# Patient Record
Sex: MALE
[De-identification: names, ages, dates, MRNs, and addresses within clinical notes are randomized per-mention and may not be internally consistent; named-entity substitution may affect disease eponyms.]

---

## 2024-07-10 ENCOUNTER — DASHBOARD ENCOUNTERS (OUTPATIENT)
Age: 24
End: 2024-07-10

## 2024-07-10 ENCOUNTER — OFFICE VISIT (OUTPATIENT)
Dept: URBAN - METROPOLITAN AREA CLINIC 80 | Facility: CLINIC | Age: 24
End: 2024-07-10
Payer: COMMERCIAL

## 2024-07-10 VITALS
BODY MASS INDEX: 23.34 KG/M2 | SYSTOLIC BLOOD PRESSURE: 110 MMHG | TEMPERATURE: 97.7 F | HEART RATE: 109 BPM | WEIGHT: 163 LBS | HEIGHT: 70 IN | DIASTOLIC BLOOD PRESSURE: 70 MMHG

## 2024-07-10 DIAGNOSIS — K92.1 BLOOD IN STOOL: ICD-10-CM

## 2024-07-10 DIAGNOSIS — R11.10 VOMITING, UNSPECIFIED VOMITING TYPE, UNSPECIFIED WHETHER NAUSEA PRESENT: ICD-10-CM

## 2024-07-10 DIAGNOSIS — R10.13 EPIGASTRIC PAIN: ICD-10-CM

## 2024-07-10 PROCEDURE — 99203 OFFICE O/P NEW LOW 30 MIN: CPT

## 2024-07-10 RX ORDER — PANTOPRAZOLE SODIUM 40 MG/1
1 TABLET TABLET, DELAYED RELEASE ORAL ONCE A DAY
Qty: 90 TABLET | Refills: 3 | OUTPATIENT
Start: 2024-07-10

## 2024-07-10 NOTE — HPI-TODAY'S VISIT:
Pt was seen at urgent care on 7/1 for abdominal pain and vomiting blood x one day. no fever or chills.  Pt was prescribed a PPI and given a GI cocktail and was discharged with ER precautions. Patient then went to the emergency department with the same complaints. He noted that the small amount of blood in his vomit was only that morning. After the UC and GI cocktail that epigastric pain went away but then shortly after the pain returnedand he went to the ED. Labs on 7/1 notable for WBC count of 7.6, hgb 15.2, and HCT 45.3. AST 27, ALT 15, alk phos 86, and total bili 0.6. A CT was completed showing No acute abnormality identified. Patient was then discharged with Protonix, carafate, and Zofran Today: pt reports that the weekend prior to 7/1 he drank "some" but not much etoh. He does note eating some salmon that may have been bad. After which, pt woke up with epigastric abdominal pain and then threw up one time on 7/1. After he threw up, he spit and noticed a small amount of bright red blood in his spit. pt notes having a normal BM that day and no change in bowel habits, except for a small amount of constipation after the ER visit. All symptoms of N/V and abdominal pain had resolved after ER visit.  Denies heartburn, melena.  Pt does note a small amount of bright red blood on the tissue that he has noticed intermittently x 1 year. Denies melena   Severity/ Pain scale: 8/10 when he was having the pain, denies current pain  Whatalleviates the symptoms: GI cocktail made it better, sitting up straight  What aggravates the symptoms: movement  Any recent weight changes: denies unintentional weight loss Any recent dietary changes: pt notes eating more fatty food and pork since he has come back home from [place] 3 weeks ago  ETOH use? Denies excessive etoh use  Change in bowel habits? normal bowel habit is 2 BM per day bss 4  Last colonoscopy and egd: never  No family history of colon polyps or colon cancer. Patient denies current nausea, heartburn, dysphagia, current abdominal pain, melena, unintentional weight loss, changes in bowel habits, except a couple of days of constipation after ER visit, and  denies loss of appetite. Patient denies blood thinner use, pacemaker/defibrillator, diabetes, kidney disease, and home O2. No fever/chills

## 2024-08-21 ENCOUNTER — CLAIMS CREATED FROM THE CLAIM WINDOW (OUTPATIENT)
Dept: URBAN - METROPOLITAN AREA CLINIC 4 | Facility: CLINIC | Age: 24
End: 2024-08-21
Payer: COMMERCIAL

## 2024-08-21 ENCOUNTER — CLAIMS CREATED FROM THE CLAIM WINDOW (OUTPATIENT)
Dept: URBAN - METROPOLITAN AREA SURGERY CENTER 19 | Facility: SURGERY CENTER | Age: 24
End: 2024-08-21
Payer: COMMERCIAL

## 2024-08-21 ENCOUNTER — TELEPHONE ENCOUNTER (OUTPATIENT)
Dept: URBAN - METROPOLITAN AREA CLINIC 80 | Facility: CLINIC | Age: 24
End: 2024-08-21

## 2024-08-21 DIAGNOSIS — K92.1 HEMATOCHEZIA: ICD-10-CM

## 2024-08-21 DIAGNOSIS — R10.13 EPIGASTRIC ABDOMINAL PAIN: ICD-10-CM

## 2024-08-21 DIAGNOSIS — K31.89 OTHER DISEASES OF STOMACH AND DUODENUM: ICD-10-CM

## 2024-08-21 DIAGNOSIS — K64.8 INTERNAL HEMORRHOIDS: ICD-10-CM

## 2024-08-21 DIAGNOSIS — K63.89 OTHER SPECIFIED DISEASES OF INTESTINE: ICD-10-CM

## 2024-08-21 DIAGNOSIS — K21.9 GASTRO-ESOPHAGEAL REFLUX DISEASE WITHOUT ESOPHAGITIS: ICD-10-CM

## 2024-08-21 DIAGNOSIS — R11.2 NAUSEA AND VOMITING, UNSPECIFIED VOMITING TYPE: ICD-10-CM

## 2024-08-21 DIAGNOSIS — K64.8 OTHER HEMORRHOIDS: ICD-10-CM

## 2024-08-21 PROCEDURE — 45380 COLONOSCOPY AND BIOPSY: CPT | Performed by: INTERNAL MEDICINE

## 2024-08-21 PROCEDURE — 43239 EGD BIOPSY SINGLE/MULTIPLE: CPT | Performed by: INTERNAL MEDICINE

## 2024-08-21 PROCEDURE — 88312 SPECIAL STAINS GROUP 1: CPT | Performed by: PATHOLOGY

## 2024-08-21 PROCEDURE — 00813 ANES UPR LWR GI NDSC PX: CPT | Performed by: NURSE ANESTHETIST, CERTIFIED REGISTERED

## 2024-08-21 PROCEDURE — 88305 TISSUE EXAM BY PATHOLOGIST: CPT | Performed by: PATHOLOGY

## 2024-08-21 RX ORDER — PANTOPRAZOLE SODIUM 40 MG/1
1 TABLET TABLET, DELAYED RELEASE ORAL ONCE A DAY
Qty: 90 TABLET | Refills: 3 | Status: ACTIVE | COMMUNITY
Start: 2024-07-10

## 2024-09-13 ENCOUNTER — OFFICE VISIT (OUTPATIENT)
Dept: URBAN - METROPOLITAN AREA TELEHEALTH 2 | Facility: TELEHEALTH | Age: 24
End: 2024-09-13